# Patient Record
Sex: FEMALE | Race: WHITE | Employment: OTHER | URBAN - NONMETROPOLITAN AREA
[De-identification: names, ages, dates, MRNs, and addresses within clinical notes are randomized per-mention and may not be internally consistent; named-entity substitution may affect disease eponyms.]

---

## 2020-08-13 ENCOUNTER — APPOINTMENT (OUTPATIENT)
Dept: CT IMAGING | Age: 70
End: 2020-08-13
Payer: MEDICARE

## 2020-08-13 ENCOUNTER — HOSPITAL ENCOUNTER (EMERGENCY)
Age: 70
Discharge: HOME | End: 2020-08-13
Attending: EMERGENCY MEDICINE
Payer: MEDICARE

## 2020-08-13 VITALS
WEIGHT: 165 LBS | BODY MASS INDEX: 30.36 KG/M2 | RESPIRATION RATE: 16 BRPM | SYSTOLIC BLOOD PRESSURE: 160 MMHG | HEIGHT: 62 IN | DIASTOLIC BLOOD PRESSURE: 86 MMHG | TEMPERATURE: 98.7 F | HEART RATE: 66 BPM | OXYGEN SATURATION: 97 %

## 2020-08-13 LAB
ALBUMIN SERPL-MCNC: 4.6 G/DL (ref 3.5–5.2)
ALP BLD-CCNC: 94 U/L (ref 35–104)
ALT SERPL-CCNC: 20 U/L (ref 5–33)
ANION GAP SERPL CALCULATED.3IONS-SCNC: 12 MMOL/L (ref 7–19)
AST SERPL-CCNC: 23 U/L (ref 5–32)
BASOPHILS ABSOLUTE: 0.1 K/UL (ref 0–0.2)
BASOPHILS RELATIVE PERCENT: 0.8 % (ref 0–1)
BILIRUB SERPL-MCNC: 0.6 MG/DL (ref 0.2–1.2)
BILIRUBIN URINE: NEGATIVE
BLOOD, URINE: NEGATIVE
BUN BLDV-MCNC: 13 MG/DL (ref 8–23)
CALCIUM SERPL-MCNC: 9.7 MG/DL (ref 8.8–10.2)
CHLORIDE BLD-SCNC: 104 MMOL/L (ref 98–111)
CLARITY: CLEAR
CO2: 28 MMOL/L (ref 22–29)
COLOR: YELLOW
CREAT SERPL-MCNC: 1 MG/DL (ref 0.5–0.9)
EOSINOPHILS ABSOLUTE: 0.1 K/UL (ref 0–0.6)
EOSINOPHILS RELATIVE PERCENT: 1 % (ref 0–5)
GFR AFRICAN AMERICAN: >59
GFR NON-AFRICAN AMERICAN: 54
GLUCOSE BLD-MCNC: 113 MG/DL (ref 74–109)
GLUCOSE URINE: NEGATIVE MG/DL
HCT VFR BLD CALC: 46.4 % (ref 37–47)
HEMOGLOBIN: 15.6 G/DL (ref 12–16)
IMMATURE GRANULOCYTES #: 0.1 K/UL
KETONES, URINE: NEGATIVE MG/DL
LACTIC ACID: 1.3 MMOL/L (ref 0.5–1.9)
LEUKOCYTE ESTERASE, URINE: NEGATIVE
LIPASE: 23 U/L (ref 13–60)
LYMPHOCYTES ABSOLUTE: 2.5 K/UL (ref 1.1–4.5)
LYMPHOCYTES RELATIVE PERCENT: 26.9 % (ref 20–40)
MCH RBC QN AUTO: 30.2 PG (ref 27–31)
MCHC RBC AUTO-ENTMCNC: 33.6 G/DL (ref 33–37)
MCV RBC AUTO: 89.9 FL (ref 81–99)
MONOCYTES ABSOLUTE: 0.8 K/UL (ref 0–0.9)
MONOCYTES RELATIVE PERCENT: 8.8 % (ref 0–10)
NEUTROPHILS ABSOLUTE: 5.8 K/UL (ref 1.5–7.5)
NEUTROPHILS RELATIVE PERCENT: 62 % (ref 50–65)
NITRITE, URINE: NEGATIVE
PDW BLD-RTO: 13.1 % (ref 11.5–14.5)
PH UA: 7.5 (ref 5–8)
PLATELET # BLD: 240 K/UL (ref 130–400)
PMV BLD AUTO: 10 FL (ref 9.4–12.3)
POTASSIUM SERPL-SCNC: 3.5 MMOL/L (ref 3.5–5)
PROTEIN UA: NEGATIVE MG/DL
RBC # BLD: 5.16 M/UL (ref 4.2–5.4)
SODIUM BLD-SCNC: 144 MMOL/L (ref 136–145)
SPECIFIC GRAVITY UA: 1.01 (ref 1–1.03)
TOTAL PROTEIN: 7.8 G/DL (ref 6.6–8.7)
UROBILINOGEN, URINE: 0.2 E.U./DL
WBC # BLD: 9.3 K/UL (ref 4.8–10.8)

## 2020-08-13 PROCEDURE — 81003 URINALYSIS AUTO W/O SCOPE: CPT

## 2020-08-13 PROCEDURE — 74175 CTA ABDOMEN W/CONTRAST: CPT

## 2020-08-13 PROCEDURE — 99283 EMERGENCY DEPT VISIT LOW MDM: CPT

## 2020-08-13 PROCEDURE — 96374 THER/PROPH/DIAG INJ IV PUSH: CPT

## 2020-08-13 PROCEDURE — 96375 TX/PRO/DX INJ NEW DRUG ADDON: CPT

## 2020-08-13 PROCEDURE — 2580000003 HC RX 258: Performed by: EMERGENCY MEDICINE

## 2020-08-13 PROCEDURE — 83605 ASSAY OF LACTIC ACID: CPT

## 2020-08-13 PROCEDURE — 6360000002 HC RX W HCPCS: Performed by: EMERGENCY MEDICINE

## 2020-08-13 PROCEDURE — 83690 ASSAY OF LIPASE: CPT

## 2020-08-13 PROCEDURE — 80053 COMPREHEN METABOLIC PANEL: CPT

## 2020-08-13 PROCEDURE — 36415 COLL VENOUS BLD VENIPUNCTURE: CPT

## 2020-08-13 PROCEDURE — 85025 COMPLETE CBC W/AUTO DIFF WBC: CPT

## 2020-08-13 PROCEDURE — 6360000004 HC RX CONTRAST MEDICATION: Performed by: EMERGENCY MEDICINE

## 2020-08-13 PROCEDURE — 99284 EMERGENCY DEPT VISIT MOD MDM: CPT

## 2020-08-13 RX ORDER — MORPHINE SULFATE 4 MG/ML
4 INJECTION, SOLUTION INTRAMUSCULAR; INTRAVENOUS ONCE
Status: COMPLETED | OUTPATIENT
Start: 2020-08-13 | End: 2020-08-13

## 2020-08-13 RX ORDER — ONDANSETRON 4 MG/1
4 TABLET, ORALLY DISINTEGRATING ORAL EVERY 8 HOURS PRN
Qty: 12 TABLET | Refills: 0 | Status: SHIPPED | OUTPATIENT
Start: 2020-08-13 | End: 2020-08-17

## 2020-08-13 RX ORDER — ONDANSETRON 2 MG/ML
4 INJECTION INTRAMUSCULAR; INTRAVENOUS ONCE
Status: COMPLETED | OUTPATIENT
Start: 2020-08-13 | End: 2020-08-13

## 2020-08-13 RX ORDER — 0.9 % SODIUM CHLORIDE 0.9 %
1000 INTRAVENOUS SOLUTION INTRAVENOUS ONCE
Status: COMPLETED | OUTPATIENT
Start: 2020-08-13 | End: 2020-08-13

## 2020-08-13 RX ORDER — OXYCODONE HYDROCHLORIDE AND ACETAMINOPHEN 5; 325 MG/1; MG/1
1 TABLET ORAL EVERY 6 HOURS PRN
Qty: 12 TABLET | Refills: 0 | Status: SHIPPED | OUTPATIENT
Start: 2020-08-13 | End: 2020-08-16

## 2020-08-13 RX ADMIN — SODIUM CHLORIDE 1000 ML: 9 INJECTION, SOLUTION INTRAVENOUS at 16:50

## 2020-08-13 RX ADMIN — ONDANSETRON 4 MG: 2 INJECTION INTRAMUSCULAR; INTRAVENOUS at 16:50

## 2020-08-13 RX ADMIN — IOPAMIDOL 90 ML: 755 INJECTION, SOLUTION INTRAVENOUS at 18:18

## 2020-08-13 RX ADMIN — MORPHINE SULFATE 4 MG: 4 INJECTION, SOLUTION INTRAMUSCULAR; INTRAVENOUS at 16:50

## 2020-08-13 SDOH — HEALTH STABILITY: MENTAL HEALTH: HOW OFTEN DO YOU HAVE A DRINK CONTAINING ALCOHOL?: NEVER

## 2020-08-13 ASSESSMENT — ENCOUNTER SYMPTOMS
DIARRHEA: 1
BACK PAIN: 0
VOMITING: 0
BLOOD IN STOOL: 0
SHORTNESS OF BREATH: 0
COUGH: 0
NAUSEA: 0
RHINORRHEA: 0
ABDOMINAL PAIN: 1
SORE THROAT: 0

## 2020-08-13 ASSESSMENT — PAIN SCALES - GENERAL
PAINLEVEL_OUTOF10: 5
PAINLEVEL_OUTOF10: 7

## 2020-08-13 NOTE — ED NOTES
Patient back from CT.   Patient ambulated to bathroom for urine sample     Elham Vogel RN  08/13/20 6040

## 2020-08-13 NOTE — ED PROVIDER NOTES
[facility]  eMERGENCY dEPARTMENT eNCOUnter      Pt Name: Torres Villagran  MRN: [id number]  [place] 1950  Date of evaluation: 8/13/2020  Provider: Luis Fernando Barrera MD    [address] [place] [address]       Chief Complaint   Patient presents with    Abdominal Pain     Pt to ED with c/o abdominal pain \"for months\"          HISTORY OF PRESENT ILLNESS   (Location/Symptom, Timing/Onset,Context/Setting, Quality, Duration, Modifying Factors, Severity)  Note limiting factors. Torres Villagran is a 65 y.o. female who presents to the emergency department for abdominal pain. She points to her lower abdomen tells me \"my guts hurt\". This is been a chronic issue for actually years not months but has been worsening. She has postprandial abdominal pain after eating anything. She has had an x-ray within the past couple weeks but they are unsure if she is ever had any CT scans which seems unusual.  She has had a normal colonoscopy besides a few polyps that were removed within the past couple months by Re Johnston in [facility]. She is also had a cholecystectomy and hysterectomy. She denied having any vomiting. She has somewhat chronic yellow watery stool but not bloody. She was never a smoker. HPI    NursingNotes were reviewed. REVIEW OF SYSTEMS    (2-9 systems for level 4, 10 or more for level 5)     Review of Systems   Constitutional: Negative for chills and fever. HENT: Negative for rhinorrhea and sore throat. Respiratory: Negative for cough and shortness of breath. Cardiovascular: Negative for chest pain and leg swelling. Gastrointestinal: Positive for abdominal pain and diarrhea. Negative for blood in stool, nausea and vomiting. Genitourinary: Negative for dysuria, frequency and urgency. Musculoskeletal: Negative for back pain and neck pain. Neurological: Negative for dizziness and headaches. All other systems reviewed and are negative. PAST MEDICALHISTORY   History reviewed.  No pertinent past medical history. SURGICAL HISTORY     History reviewed. No pertinent surgical history. CURRENT MEDICATIONS     Previous Medications    No medications on file       ALLERGIES     Demerol hcl [meperidine]; Erythromycin; and Lortab [hydrocodone-acetaminophen]    FAMILY HISTORY     History reviewed. No pertinent family history.        SOCIAL HISTORY       Social History     Socioeconomic History    Marital status:      Spouse name: None    Number of children: None    Years of education: None    Highest education level: None   Occupational History    None   Social Needs    Financial resource strain: None    Food insecurity     Worry: None     Inability: None    Transportation needs     Medical: None     Non-medical: None   Tobacco Use    Smoking status: Never Smoker    Smokeless tobacco: Never Used   Substance and Sexual Activity    Alcohol use: Never     Frequency: Never    Drug use: Never    Sexual activity: None   Lifestyle    Physical activity     Days per week: None     Minutes per session: None    Stress: None   Relationships    Social connections     Talks on phone: None     Gets together: None     Attends Spiritism service: None     Active member of club or organization: None     Attends meetings of clubs or organizations: None     Relationship status: None    Intimate partner violence     Fear of current or ex partner: None     Emotionally abused: None     Physically abused: None     Forced sexual activity: None   Other Topics Concern    None   Social History Narrative    None       SCREENINGS    Mary Coma Scale  Eye Opening: Spontaneous  Best Verbal Response: Oriented  Best Motor Response: Obeys commands  Mary Coma Scale Score: 15        PHYSICAL EXAM    (up to 7 for level 4, 8 or more for level 5)     ED Triage Vitals   BP Temp Temp Source Pulse Resp SpO2 Height Weight   08/13/20 1505 08/13/20 1505 08/13/20 1633 08/13/20 1505 08/13/20 1505 08/13/20 1505 08/13/20 1505 08/13/20 1505   (!) 168/83 98.5 °F (36.9 °C) Oral 76 20 94 % 5' 2\" (1.575 m) 165 lb (74.8 kg)       Physical Exam  Vitals signs and nursing note reviewed. Constitutional:       General: She is not in acute distress. Appearance: Normal appearance. She is well-developed. She is not ill-appearing, toxic-appearing or diaphoretic. Comments: Crying anxious   HENT:      Head: Normocephalic and atraumatic. Right Ear: External ear normal.      Left Ear: External ear normal.      Mouth/Throat:      Mouth: Mucous membranes are moist.   Eyes:      Conjunctiva/sclera: Conjunctivae normal.   Neck:      Musculoskeletal: Normal range of motion. Trachea: No tracheal deviation. Cardiovascular:      Rate and Rhythm: Normal rate and regular rhythm. Heart sounds: Normal heart sounds. No murmur. Pulmonary:      Effort: No respiratory distress. Breath sounds: Normal breath sounds. No wheezing or rales. Abdominal:      Palpations: Abdomen is soft. There is no mass. Tenderness: There is abdominal tenderness. Comments: Mild lower abdominal tenderness seems to have pain out of proportion to exam though   Musculoskeletal: Normal range of motion. Skin:     General: Skin is warm and dry. Neurological:      Mental Status: She is alert and oriented to person, place, and time. GCS: GCS eye subscore is 4. GCS verbal subscore is 5. GCS motor subscore is 6. DIAGNOSTIC RESULTS           RADIOLOGY:  Non-plain film images such as CT, Ultrasound and MRI are read by the radiologist. Plain radiographic images are visualized and preliminarily interpreted bythe emergency physician with the below findings:        CTA ABDOMEN W CONTRAST   Final Result   1. 2 cm cystic lesion within the pancreas tail. Nonemergent GI   follow-up recommended. 2. Normal appearance of the abdominal aorta and visceral arterial   branches.    Signed by Dr Angela Gabriel on 8/13/2020 6:27 PM LABS:  Labs Reviewed   COMPREHENSIVE METABOLIC PANEL - Abnormal; Notable for the following components:       Result Value    Glucose 113 (*)     CREATININE 1.0 (*)     GFR Non- 54 (*)     All other components within normal limits   CBC WITH AUTO DIFFERENTIAL   LIPASE   URINE RT REFLEX TO CULTURE   LACTIC ACID, PLASMA       All other labs were within normal range or not returned as of this dictation. EMERGENCY DEPARTMENT COURSE and DIFFERENTIAL DIAGNOSIS/MDM:   Vitals:    Vitals:    08/13/20 1505 08/13/20 1633   BP: (!) 168/83 (!) 160/86   Pulse: 76 66   Resp: 20 16   Temp: 98.5 °F (36.9 °C) 98.7 °F (37.1 °C)   TempSrc:  Oral   SpO2: 94% 97%   Weight: 165 lb (74.8 kg)    Height: 5' 2\" (1.575 m)        MDM  Number of Diagnoses or Management Options     Amount and/or Complexity of Data Reviewed  Clinical lab tests: ordered and reviewed  Tests in the radiology section of CPT®: ordered and reviewed  Independent visualization of images, tracings, or specimens: yes      Patient was somewhat chronic abdominal pain mostly post prandial it seems, has had gallbladder removed, has seen GI with negative colonoscopy as well besides some polyps, due to the severity of pain and her being tearful but quite benign exam did do a CTA to evaluate for possible mesenteric ischemia and stenosis however all blood vessels appear patent and her laboratory evaluation is also very reassuring, incidental pancreatic cyst which I have informed them of. She is stable for discharge and outpatient follow-up GI for further work-up and monitoring of the pancreatic cyst, understands follow-up and return precautions      A CHRISTOPHER report was obtained. Request number [id number]. I have reviewed this report and have considered its contents in selecting medications for outpatient care of this patient's illness. I have discussed the risks of use of these medications with patient.         CONSULTS:  None    PROCEDURES:  Unless otherwise noted below, none     Procedures    FINAL IMPRESSION      1. Chronic abdominal pain    2. Pancreatic cyst          DISPOSITION/PLAN   DISPOSITION Decision To Discharge 08/13/2020 06:36:10 PM      PATIENT REFERRED TO:  Roman Shipley MD  [address]    Schedule an appointment as soon as possible for a visit in 1 week        DISCHARGE MEDICATIONS:  New Prescriptions    ONDANSETRON (ZOFRAN ODT) 4 MG DISINTEGRATING TABLET    Take 1 tablet by mouth every 8 hours as needed for Nausea or Vomiting    OXYCODONE-ACETAMINOPHEN (PERCOCET) 5-325 MG PER TABLET    Take 1 tablet by mouth every 6 hours as needed for Pain for up to 3 days. Intended supply: 3 days.  Take lowest dose possible to manage pain          (Please note that portions of this note were completed with a voice recognition program.  Efforts were made to edit thedictations but occasionally words are mis-transcribed.)    Gilda Geiger MD (electronically signed)  Attending Emergency Physician        Sarina Askew MD  08/13/20 8248

## 2020-08-17 ENCOUNTER — APPOINTMENT (OUTPATIENT)
Dept: CT IMAGING | Age: 70
End: 2020-08-17
Payer: MEDICARE

## 2020-08-17 ENCOUNTER — HOSPITAL ENCOUNTER (EMERGENCY)
Age: 70
Discharge: HOME | End: 2020-08-17
Payer: MEDICARE

## 2020-08-17 ENCOUNTER — OFFICE VISIT (OUTPATIENT)
Dept: GASTROENTEROLOGY | Age: 70
End: 2020-08-17
Payer: MEDICARE

## 2020-08-17 VITALS
BODY MASS INDEX: 29.63 KG/M2 | OXYGEN SATURATION: 97 % | RESPIRATION RATE: 14 BRPM | HEART RATE: 60 BPM | TEMPERATURE: 97 F | SYSTOLIC BLOOD PRESSURE: 125 MMHG | WEIGHT: 162 LBS | DIASTOLIC BLOOD PRESSURE: 78 MMHG

## 2020-08-17 VITALS
HEIGHT: 62 IN | BODY MASS INDEX: 29.44 KG/M2 | DIASTOLIC BLOOD PRESSURE: 80 MMHG | HEART RATE: 65 BPM | WEIGHT: 160 LBS | SYSTOLIC BLOOD PRESSURE: 130 MMHG | OXYGEN SATURATION: 98 %

## 2020-08-17 LAB
ALBUMIN SERPL-MCNC: 4 G/DL (ref 3.5–5.2)
ALP BLD-CCNC: 77 U/L (ref 35–104)
ALT SERPL-CCNC: 16 U/L (ref 5–33)
AMPHETAMINE SCREEN, URINE: NEGATIVE
ANION GAP SERPL CALCULATED.3IONS-SCNC: 11 MMOL/L (ref 7–19)
AST SERPL-CCNC: 21 U/L (ref 5–32)
BARBITURATE SCREEN URINE: NEGATIVE
BASOPHILS ABSOLUTE: 0.1 K/UL (ref 0–0.2)
BASOPHILS RELATIVE PERCENT: 0.5 % (ref 0–1)
BENZODIAZEPINE SCREEN, URINE: POSITIVE
BILIRUB SERPL-MCNC: 0.8 MG/DL (ref 0.2–1.2)
BILIRUBIN URINE: NEGATIVE
BLOOD, URINE: NEGATIVE
BUN BLDV-MCNC: 15 MG/DL (ref 8–23)
CALCIUM SERPL-MCNC: 9.6 MG/DL (ref 8.8–10.2)
CANNABINOID SCREEN URINE: NEGATIVE
CHLORIDE BLD-SCNC: 102 MMOL/L (ref 98–111)
CLARITY: ABNORMAL
CO2: 26 MMOL/L (ref 22–29)
COCAINE METABOLITE SCREEN URINE: NEGATIVE
COLOR: ABNORMAL
CREAT SERPL-MCNC: 1.1 MG/DL (ref 0.5–0.9)
EOSINOPHILS ABSOLUTE: 0.1 K/UL (ref 0–0.6)
EOSINOPHILS RELATIVE PERCENT: 0.8 % (ref 0–5)
ETHANOL: <10 MG/DL (ref 0–0.08)
GFR AFRICAN AMERICAN: >59
GFR NON-AFRICAN AMERICAN: 49
GLUCOSE BLD-MCNC: 120 MG/DL (ref 74–109)
GLUCOSE URINE: NEGATIVE MG/DL
HCT VFR BLD CALC: 45.2 % (ref 37–47)
HEMOGLOBIN: 15 G/DL (ref 12–16)
IMMATURE GRANULOCYTES #: 0 K/UL
KETONES, URINE: NEGATIVE MG/DL
LEUKOCYTE ESTERASE, URINE: NEGATIVE
LYMPHOCYTES ABSOLUTE: 2.1 K/UL (ref 1.1–4.5)
LYMPHOCYTES RELATIVE PERCENT: 19.4 % (ref 20–40)
Lab: ABNORMAL
MCH RBC QN AUTO: 30.2 PG (ref 27–31)
MCHC RBC AUTO-ENTMCNC: 33.2 G/DL (ref 33–37)
MCV RBC AUTO: 91.1 FL (ref 81–99)
MONOCYTES ABSOLUTE: 1.1 K/UL (ref 0–0.9)
MONOCYTES RELATIVE PERCENT: 10 % (ref 0–10)
NEUTROPHILS ABSOLUTE: 7.5 K/UL (ref 1.5–7.5)
NEUTROPHILS RELATIVE PERCENT: 68.9 % (ref 50–65)
NITRITE, URINE: NEGATIVE
OPIATE SCREEN URINE: NEGATIVE
PDW BLD-RTO: 13.1 % (ref 11.5–14.5)
PH UA: 6.5 (ref 5–8)
PLATELET # BLD: 218 K/UL (ref 130–400)
PMV BLD AUTO: 9.7 FL (ref 9.4–12.3)
POTASSIUM REFLEX MAGNESIUM: 3.9 MMOL/L (ref 3.5–5)
PROTEIN UA: NEGATIVE MG/DL
RBC # BLD: 4.96 M/UL (ref 4.2–5.4)
SODIUM BLD-SCNC: 139 MMOL/L (ref 136–145)
SPECIFIC GRAVITY UA: 1.01 (ref 1–1.03)
TOTAL PROTEIN: 6.8 G/DL (ref 6.6–8.7)
UROBILINOGEN, URINE: 0.2 E.U./DL
WBC # BLD: 10.9 K/UL (ref 4.8–10.8)

## 2020-08-17 PROCEDURE — 99205 OFFICE O/P NEW HI 60 MIN: CPT | Performed by: NURSE PRACTITIONER

## 2020-08-17 PROCEDURE — 3017F COLORECTAL CA SCREEN DOC REV: CPT | Performed by: NURSE PRACTITIONER

## 2020-08-17 PROCEDURE — 81003 URINALYSIS AUTO W/O SCOPE: CPT

## 2020-08-17 PROCEDURE — 1036F TOBACCO NON-USER: CPT | Performed by: NURSE PRACTITIONER

## 2020-08-17 PROCEDURE — G8427 DOCREV CUR MEDS BY ELIG CLIN: HCPCS | Performed by: NURSE PRACTITIONER

## 2020-08-17 PROCEDURE — 99285 EMERGENCY DEPT VISIT HI MDM: CPT

## 2020-08-17 PROCEDURE — 80053 COMPREHEN METABOLIC PANEL: CPT

## 2020-08-17 PROCEDURE — G0480 DRUG TEST DEF 1-7 CLASSES: HCPCS

## 2020-08-17 PROCEDURE — 36415 COLL VENOUS BLD VENIPUNCTURE: CPT

## 2020-08-17 PROCEDURE — 4040F PNEUMOC VAC/ADMIN/RCVD: CPT | Performed by: NURSE PRACTITIONER

## 2020-08-17 PROCEDURE — G8417 CALC BMI ABV UP PARAM F/U: HCPCS | Performed by: NURSE PRACTITIONER

## 2020-08-17 PROCEDURE — 1090F PRES/ABSN URINE INCON ASSESS: CPT | Performed by: NURSE PRACTITIONER

## 2020-08-17 PROCEDURE — G8400 PT W/DXA NO RESULTS DOC: HCPCS | Performed by: NURSE PRACTITIONER

## 2020-08-17 PROCEDURE — 80307 DRUG TEST PRSMV CHEM ANLYZR: CPT

## 2020-08-17 PROCEDURE — 6370000000 HC RX 637 (ALT 250 FOR IP): Performed by: NURSE PRACTITIONER

## 2020-08-17 PROCEDURE — 1123F ACP DISCUSS/DSCN MKR DOCD: CPT | Performed by: NURSE PRACTITIONER

## 2020-08-17 PROCEDURE — 85025 COMPLETE CBC W/AUTO DIFF WBC: CPT

## 2020-08-17 RX ORDER — POTASSIUM CHLORIDE 1500 MG/1
TABLET, EXTENDED RELEASE ORAL
COMMUNITY
Start: 2020-07-30

## 2020-08-17 RX ORDER — ALPRAZOLAM 1 MG/1
TABLET ORAL
COMMUNITY
Start: 2020-07-31

## 2020-08-17 RX ORDER — ATENOLOL 100 MG/1
TABLET ORAL
COMMUNITY
Start: 2020-07-19

## 2020-08-17 RX ORDER — DICYCLOMINE HYDROCHLORIDE 10 MG/1
10 CAPSULE ORAL
COMMUNITY

## 2020-08-17 RX ORDER — OLMESARTAN MEDOXOMIL 40 MG/1
TABLET ORAL
COMMUNITY
Start: 2020-07-19

## 2020-08-17 RX ORDER — AMLODIPINE BESYLATE 5 MG/1
TABLET ORAL
COMMUNITY
Start: 2020-07-27

## 2020-08-17 RX ORDER — OXYCODONE HYDROCHLORIDE AND ACETAMINOPHEN 5; 325 MG/1; MG/1
TABLET ORAL
COMMUNITY

## 2020-08-17 RX ORDER — OXYCODONE HYDROCHLORIDE AND ACETAMINOPHEN 5; 325 MG/1; MG/1
1 TABLET ORAL ONCE
Status: COMPLETED | OUTPATIENT
Start: 2020-08-17 | End: 2020-08-17

## 2020-08-17 RX ORDER — PANTOPRAZOLE SODIUM 40 MG/1
TABLET, DELAYED RELEASE ORAL
COMMUNITY
Start: 2020-05-29

## 2020-08-17 RX ORDER — SIMVASTATIN 40 MG
TABLET ORAL
COMMUNITY
Start: 2020-05-29

## 2020-08-17 RX ADMIN — OXYCODONE HYDROCHLORIDE AND ACETAMINOPHEN 1 TABLET: 5; 325 TABLET ORAL at 17:56

## 2020-08-17 ASSESSMENT — ENCOUNTER SYMPTOMS
RECTAL PAIN: 0
COUGH: 0
SHORTNESS OF BREATH: 0
ABDOMINAL PAIN: 1
BLOOD IN STOOL: 0
DIARRHEA: 1
VOMITING: 0
TROUBLE SWALLOWING: 1
ABDOMINAL DISTENTION: 0
ABDOMINAL PAIN: 1
CONSTIPATION: 1
CHOKING: 0
NAUSEA: 1
ANAL BLEEDING: 0

## 2020-08-17 ASSESSMENT — PAIN SCALES - GENERAL: PAINLEVEL_OUTOF10: 7

## 2020-08-17 NOTE — ED PROVIDER NOTES
[facility]  eMERGENCY dEPARTMENT eNCOUnter      Pt Name: Yoanna Salcedo  MRN: [id number]  [place] 1950  Date of evaluation: 8/17/2020  Provider: DELFINA Mcallister    CHIEF COMPLAINT       Chief Complaint   Patient presents with    Mental Health Problem         HISTORY OF PRESENT ILLNESS   (Location/Symptom, Timing/Onset,Context/Setting, Quality, Duration, Modifying Factors, Severity)  Note limiting factors. Yoanna Salcedo is a 65 y.o. female who presents to the emergency department with her . HAs been having chronic abd pain. SAys she can't take it anymore. perfers not be alive. No active suicidal plan. Patient was seeing GI when she expressed her state of mind and was sent over here. Her  believes she needs some help. Patient has a lot of stress in her life per her  and she manifests that with pain in her abdomen. She denies any alcohol or drugs. She denies any previous suicide attempts. She says she has not seen a psychiatrist.  She does take benzos for anxiety chronically. The history is provided by the patient and the spouse. Mental Health Problem   Presenting symptoms: suicidal thoughts    Degree of incapacity (severity): Moderate  Onset quality:  Unable to specify  Context: stressful life event    Context: not alcohol use and not drug abuse    Treatment compliance:  Untreated  Associated symptoms: abdominal pain    Associated symptoms comment:  Chronic pain  Risk factors: no hx of mental illness and no hx of suicide attempts        NursingNotes were reviewed. REVIEW OF SYSTEMS    (2-9 systems for level 4, 10 or more for level 5)     Review of Systems   Gastrointestinal: Positive for abdominal pain. Psychiatric/Behavioral: Positive for suicidal ideas. Except as noted above the remainder of the review of systems was reviewed and negative. PAST MEDICAL HISTORY   History reviewed. No pertinent past medical history.       SURGICALHISTORY Past Surgical History:   Procedure Laterality Date    COLONOSCOPY  06/2020    Dr Bernard Kind: : polyps per pt recall    UPPER GASTROINTESTINAL ENDOSCOPY  approx [id number]    Dr Blanco Brand: :\" normal per pt recall         CURRENT MEDICATIONS       Discharge Medication List as of 8/17/2020  6:30 PM      CONTINUE these medications which have NOT CHANGED    Details   atenolol (TENORMIN) 100 MG tablet TAKE 1 TABLET BY MOUTH EVERY DAYHistorical Med      olmesartan (BENICAR) 40 MG tablet TAKE 1 TABLET BY MOUTH EVERY DAYHistorical Med      amLODIPine (NORVASC) 5 MG tablet TAKE 1 TABLET BY MOUTH EVERY DAY IN THE EVENINGHistorical Med      pantoprazole (PROTONIX) 40 MG tablet TAKE 1 TABLET BY MOUTH TWICE A DAYHistorical Med      simvastatin (ZOCOR) 40 MG tablet TAKE 1 TABLET BY MOUTH EVERY DAYHistorical Med      KLOR-CON M20 20 MEQ extended release tablet TAKE 1 TABLET BY MOUTH EVERY DAY, DAWHistorical Med      ALPRAZolam (XANAX) 1 MG tablet TAKE 1 TABLET BY MOUTH THREE TIMES A DAYHistorical Med      oxyCODONE-acetaminophen (PERCOCET) 5-325 MG per tablet oxycodone-acetaminophen 5 mg-325 mg tabletHistorical Med      dicyclomine (BENTYL) 10 MG capsule Take 10 mg by mouth 3 times daily (before meals)Historical Med             ALLERGIES     Demerol hcl [meperidine];  Erythromycin; and Lortab [hydrocodone-acetaminophen]    FAMILY HISTORY       Family History   Problem Relation Age of Onset    Prostate Cancer Father     Cancer Father         spread thur out body    Colon Polyps Brother     Colon Cancer Neg Hx           SOCIAL HISTORY       Social History     Socioeconomic History    Marital status:      Spouse name: None    Number of children: None    Years of education: None    Highest education level: None   Occupational History    None   Social Needs    Financial resource strain: None    Food insecurity     Worry: None     Inability: None    Transportation needs     Medical: None     Non-medical: None   Tobacco Use  Smoking status: Never Smoker    Smokeless tobacco: Never Used   Substance and Sexual Activity    Alcohol use: Never     Frequency: Never    Drug use: Never    Sexual activity: None   Lifestyle    Physical activity     Days per week: None     Minutes per session: None    Stress: None   Relationships    Social connections     Talks on phone: None     Gets together: None     Attends Scientology service: None     Active member of club or organization: None     Attends meetings of clubs or organizations: None     Relationship status: None    Intimate partner violence     Fear of current or ex partner: None     Emotionally abused: None     Physically abused: None     Forced sexual activity: None   Other Topics Concern    None   Social History Narrative    None       SCREENINGS    [place] Coma Scale  Eye Opening: Spontaneous  Best Verbal Response: Oriented  Best Motor Response: Obeys commands  Mary Coma Scale Score: 15 @FLOW([id number])@      PHYSICAL EXAM    (up to 7 for level 4, 8 or more for level 5)     ED Triage Vitals [08/17/20 1607]   BP Temp Temp src Pulse Resp SpO2 Height Weight   125/78 97 °F (36.1 °C) -- 65 14 97 % -- 162 lb (73.5 kg)       Physical Exam  Vitals signs and nursing note reviewed. Constitutional:       Appearance: She is well-developed. HENT:      Head: Normocephalic and atraumatic. Eyes:      General: No scleral icterus. Right eye: No discharge. Left eye: No discharge. Neck:      Musculoskeletal: Normal range of motion and neck supple. Cardiovascular:      Rate and Rhythm: Normal rate. Pulmonary:      Effort: No respiratory distress. Neurological:      Mental Status: She is alert. Psychiatric:         Mood and Affect: Affect is flat. Speech: She is noncommunicative. Behavior: Behavior normal.         Thought Content: Thought content includes suicidal ideation.          Cognition and Memory: Cognition normal.         DIAGNOSTIC RESULTS EKG: All EKG's are interpreted by the Emergency Department Physician who either signs or Co-signsthis chart in the absence of a cardiologist.        RADIOLOGY:   Baljit Miller such as CT, Ultrasound and MRI are read by the radiologist. Plain radiographic images are visualized and preliminarily interpreted by the emergency physician with the below findings:      Interpretation per the Radiologist below, if available at the time of this note:    No orders to display         ED BEDSIDEULTRASOUND:   Performed by ED Physician -none    LABS:  Labs Reviewed   CBC WITH AUTO DIFFERENTIAL - Abnormal; Notable for the following components:       Result Value    WBC 10.9 (*)     Neutrophils % 68.9 (*)     Lymphocytes % 19.4 (*)     Monocytes Absolute 1.10 (*)     All other components within normal limits   COMPREHENSIVE METABOLIC PANEL W/ REFLEX TO MG FOR LOW K - Abnormal; Notable for the following components:    Glucose 120 (*)     CREATININE 1.1 (*)     GFR Non- 49 (*)     All other components within normal limits   URINE RT REFLEX TO CULTURE - Abnormal; Notable for the following components:    Clarity, UA CLOUDY (*)     All other components within normal limits   URINE DRUG SCREEN - Abnormal; Notable for the following components:    Benzodiazepine Screen, Urine Positive (*)     All other components within normal limits   ETHANOL       All other labs were within normal range or not returned as of this dictation. EMERGENCY DEPARTMENT COURSE and DIFFERENTIALDIAGNOSIS/MDM:   Vitals:    Vitals:    08/17/20 1607 08/17/20 1608   BP: 125/78    Pulse: 65 60   Resp: 14    Temp: 97 °F (36.1 °C)    SpO2: 97%    Weight: 162 lb (73.5 kg)            MDM   Pt seen by [facility] and ok'd to go home. DR Patti Iverson. Both pt and  refused a head CT. Resources to make an outpt appointment have been given to the pt and . Pt is impatient and wants to leave.    is agreeable with this plan    CONSULTS:  None    PROCEDURES:  Unless otherwise noted below, none     Procedures    FINAL IMPRESSION      1. Anxiety state    2. Depression, unspecified depression type    3.  Suicidal thoughts        DISPOSITION/PLAN   DISPOSITION        PATIENT REFERRED TO:  [address]  [phone]  Schedule an appointment as soon as possible for a visit  See contact list of psychiatrists      DISCHARGE MEDICATIONS:  Discharge Medication List as of 8/17/2020  6:30 PM             (Please note that portions of this note were completed with a voice recognitionprogram.  Efforts were made to edit the dictations but occasionally words are mis-transcribed.)    DELFINA Schultz (electronically signed)          DELFINA Schultz  08/18/20 3234

## 2020-08-17 NOTE — PROGRESS NOTES
[facility] ADULT INITIAL INTAKE ASSESSMENT     8/17/20    Jesús Cameron ,a 65 y.o. female, presents to the ED for a psychiatric assessment. ED Arrival time: [address]   ED physician: Bal Edouard [facility] AFFILIATE OF [facility] Notification time: [phone]  JAVIER Assessment start time: 1805  Psychiatrist call time: [phone] with Dr. Sherry Jung    Patient is referred by: GI physician    Reason for visit to ED - Presenting problem:     PT states reason for ED visit, \"I only said that I wanted to die because I was in so much pain, I did not mean that I would hurt myself, if I could take back what I said I would. \"    Patient seen in room lying in bed with  at bedside. Patient has been dealing with chronic abdominal pain for a couple of years and she reports \"it's just been taking a toll on me\". Patient denies current suicidal thoughts, no homicidal thoughts and no AVH. Patient is not delusional or psychotic. Patient's  comfortable with patient going home, there are no guns in the home and patient does not have any psychiatric history.      Duration of symptoms: 2 years    Current Stressors: abdominal pain    C-SSRS Completed: yes    SI:  denies   Plan: no   Past SI attempts: no   If yes, when and how many times:  Describe suicide attempts:   HI: denies  If yes describe:   Delusions: denies  If yes describe:   Hallucinations: denies   If yes describe:   Risk of Harm to self: Self injurious/self mutilation behaviorsno   If yes explain:   Was it within the past 6 months: no   Risk of Harm to others: no   If yes explain:   Was it within the past 6 months: no   Trauma History: denies  Anxiety 1-10:  10  Explain if increased:   Depression 1-10:  0  Explain if increased:   Level of function outside hospital decreased: no   If yes explain:       Psychiatric Hospitalizations: No   Where & When: n/a  Outpatient Psychiatric Treatment: denies    Family History:    Family history of mental illness: yes father had tablet, TAKE 1 TABLET BY MOUTH EVERY DAY, Disp: , Rfl:     ALPRAZolam (XANAX) 1 MG tablet, TAKE 1 TABLET BY MOUTH THREE TIMES A DAY, Disp: , Rfl:     oxyCODONE-acetaminophen (PERCOCET) 5-325 MG per tablet, oxycodone-acetaminophen 5 mg-325 mg tablet, Disp: , Rfl:     dicyclomine (BENTYL) 10 MG capsule, Take 10 mg by mouth 3 times daily (before meals), Disp: , Rfl:      Mental Status Evaluation:     Appearance:  age appropriate   Behavior:  Restless & fidgety   Speech:  normal pitch and normal volume   Mood:  anxious   Affect:  normal   Thought Process:  circumstantial   Thought Content:  Denies SI, HI, AVH, not delusional or psychotic   Sensorium:  person, place, time/date and situation   Cognition:  grossly intact   Insight:  good       Collateral Information:     Name: Gardenia Schofield  Relationship:   Phone Number: [phone]  Collateral: See above    Current living arrangement:Lives with  in [address]: Self and   Employment: Retired    Disposition:     Choose one of the options below for disposition:     1. Decision to admit to :no      Does the patient have a guardian or Medical POA: no  Has the guardian been notified or Medical POA:       2. Decision to Discharge:   Does not meet criteria for acceptance to   unit due to: denies SI, HI, AVH, not delusional or psychotic  Patient given contact information for local psychiatrist in area due to depression and anxiety related to her chronic abdominal pain. Safety plan completed with patient and patient given copy and copy sent with ER chart.     3. Transferred:       Patient was transferred due to: n/a    Other follow up information provided:      Suly Mack RN

## 2020-08-17 NOTE — ED TRIAGE NOTES
PT presents to ED c/o SI thoughts, no plan, denies hi. PT has had abd pain x several months and states her pain is making her wish she was dead.

## 2020-08-17 NOTE — PROGRESS NOTES
Subjective:     Patient ID: Juana Hernández is a 65 y.o. female  PCP: Nan Keith  Referring Provider: Valentino Ohs, MD    HPI  Patient presents to the office today with the following complaints: New Patient and Abdominal Pain    Abdominal Pain  Patient complains of abdominal pain. The pain is located in the lower abdomen. The pain is described as \"unbearable. \" Onset was years ago. This seems to be worsening. Aggravating factors include food. Alleviating factors include pain medication. Associated symptoms include nausea, diarrhea, heartburn, indigestion, night sweats, and loss of appetite. The patient denies hematochezia. She reports stools are dark in color, tarry. She reports small BM daily. She has tried and failed stool softeners, Colestid, and Bentyl. Reports she has not ate much at all since ER visit. She reports some trouble swallowing, \"I get choked\" with solids and liquids. She reports post prandial \"bile diarrhea. \"    Pt is very tearful. \"I just want to die. \"  \"When I am by myself, if I had the means I would take care of it. \"  Pt  states she cries daily and often mentions she wants to die. Pt denies any suicidal plan today. \"I just want my stomach better. \"  Tearful throughout entire visit. She was seen in ER on 8/13/2020  Narrative    CTA ABDOMEN W CONTRAST    8/13/2020 6:23 PM    History: Postprandial abdominal pain and diarrhea. Symptoms of    mesenteric ischemia. In order to have a CT radiation dose as low as reasonably achievable    Automated Exposure Control was utilized for adjustment of the mA    and/or KV according to patient size. DLP in mGycm= 1167. Abdomen pelvis CT with IV contrast injection. No comparison    Cardiomegaly. Chronic change at the lung bases. Cholecystectomy clips. Normal liver and spleen. Lobular 22 x 20 mm cystic lesion within the tail of the pancreas.     Nonemergent GI follow-up can be obtained to determine whether CT follow-up or endoscopic ultrasound evaluation is most appropriate. Normal abdominal aorta and iliac arteries. Normally patent celiac artery, SMA, and SAMMY. No bowel dilation. No mesenteric edema. Scattered sigmoid diverticula with no diverticulitis. No colitis. No appendicitis. No pelvic mass or fluid.         Impression    1. 2 cm cystic lesion within the pancreas tail. Nonemergent GI    follow-up recommended. 2. Normal appearance of the abdominal aorta and visceral arterial    branches. Signed by Dr Libra Servin on 8/13/2020 6:27 PM      Pt states cyst on pancreas is known to patient. Last colonoscopy 6/2020 - polyps per patient     This was my first time assessing Ms. Carranza    Assessment:     1. Depression with suicidal ideation    2. Chronic abdominal pain    3. Tearfulness         Plan:   - I am very concerned with patient's mental health today as she is expressing suicidal thoughts. Pt reports several stressors in her life currently and family history of suicide. Pt agrees to be evaluated at [facility]) ER for suicidal ideations and severe depression. Pt and  agree to head to ER.    - Plan to schedule EGD for evaluation of abdominal pain  - Follow up after EGD   - Continue Protonix    Due to patient's mental state, there is a direct threat to her life today that requires immediate evaluation. Orders  No orders of the defined types were placed in this encounter. Medications  No orders of the defined types were placed in this encounter. Patient History:     History reviewed. No pertinent past medical history.     Past Surgical History:   Procedure Laterality Date    COLONOSCOPY  06/2020    Dr Rigo Milligan: : polyps per pt recall    UPPER GASTROINTESTINAL ENDOSCOPY  approx [id number]    Dr Indira Le: :\" normal per pt recall       Family History   Problem Relation Age of Onset    Prostate Cancer Father     Cancer Father         spread thur out body    Colon Polyps Brother     Colon Cancer Neg Hx        Social History     Socioeconomic History    Marital status:      Spouse name: None    Number of children: None    Years of education: None    Highest education level: None   Occupational History    None   Social Needs    Financial resource strain: None    Food insecurity     Worry: None     Inability: None    Transportation needs     Medical: None     Non-medical: None   Tobacco Use    Smoking status: Never Smoker    Smokeless tobacco: Never Used   Substance and Sexual Activity    Alcohol use: Never     Frequency: Never    Drug use: Never    Sexual activity: None   Lifestyle    Physical activity     Days per week: None     Minutes per session: None    Stress: None   Relationships    Social connections     Talks on phone: None     Gets together: None     Attends Yarsani service: None     Active member of club or organization: None     Attends meetings of clubs or organizations: None     Relationship status: None    Intimate partner violence     Fear of current or ex partner: None     Emotionally abused: None     Physically abused: None     Forced sexual activity: None   Other Topics Concern    None   Social History Narrative    None       Current Outpatient Medications   Medication Sig Dispense Refill    atenolol (TENORMIN) 100 MG tablet TAKE 1 TABLET BY MOUTH EVERY DAY      olmesartan (BENICAR) 40 MG tablet TAKE 1 TABLET BY MOUTH EVERY DAY      amLODIPine (NORVASC) 5 MG tablet TAKE 1 TABLET BY MOUTH EVERY DAY IN THE EVENING      pantoprazole (PROTONIX) 40 MG tablet TAKE 1 TABLET BY MOUTH TWICE A DAY      simvastatin (ZOCOR) 40 MG tablet TAKE 1 TABLET BY MOUTH EVERY DAY      KLOR-CON M20 20 MEQ extended release tablet TAKE 1 TABLET BY MOUTH EVERY DAY      ALPRAZolam (XANAX) 1 MG tablet TAKE 1 TABLET BY MOUTH THREE TIMES A DAY      oxyCODONE-acetaminophen (PERCOCET) 5-325 MG per tablet oxycodone-acetaminophen 5 mg-325 mg tablet      dicyclomine (BENTYL) 10 MG capsule Take 10 mg by mouth 3 times daily (before meals)       No current facility-administered medications for this visit. Allergies   Allergen Reactions    Demerol Hcl [Meperidine]     Erythromycin     Lortab [Hydrocodone-Acetaminophen]        Review of Systems   Constitutional: Positive for activity change, appetite change and fatigue. Negative for fever and unexpected weight change. HENT: Positive for trouble swallowing. Respiratory: Negative for cough, choking and shortness of breath. Cardiovascular: Negative for chest pain. Gastrointestinal: Positive for abdominal pain, constipation, diarrhea and nausea. Negative for abdominal distention, anal bleeding, blood in stool, rectal pain and vomiting. Reflux     Allergic/Immunologic: Negative for food allergies. Psychiatric/Behavioral: Positive for self-injury and suicidal ideas. The patient is nervous/anxious. All other systems reviewed and are negative. Objective:     /80   Pulse 65   Ht 5' 2\" (1.575 m)   Wt 160 lb (72.6 kg)   SpO2 98%   BMI 29.26 kg/m²     Physical Exam  Vitals signs reviewed. Constitutional:       General: She is not in acute distress. Appearance: She is well-developed. HENT:      Head: Normocephalic and atraumatic. Right Ear: External ear normal.      Left Ear: External ear normal.      Nose: Nose normal.      Comments: Mask on     Mouth/Throat:      Comments: Mask on  Eyes:      Conjunctiva/sclera: Conjunctivae normal.      Pupils: Pupils are equal, round, and reactive to light. Neck:      Musculoskeletal: Normal range of motion and neck supple. Cardiovascular:      Rate and Rhythm: Normal rate and regular rhythm. Heart sounds: Normal heart sounds. No murmur. No friction rub. No gallop. Pulmonary:      Effort: Pulmonary effort is normal. No respiratory distress. Breath sounds: Normal breath sounds.    Abdominal:      General: Bowel sounds are normal. There is no distension. Palpations: Abdomen is soft. There is no mass. Tenderness: There is no abdominal tenderness. There is no guarding or rebound. Musculoskeletal: Normal range of motion. Skin:     General: Skin is warm and dry. Findings: No rash. Nails: There is no clubbing. Neurological:      Mental Status: She is alert and oriented to person, place, and time. Gait: Gait normal.   Psychiatric:         Mood and Affect: Mood is depressed. Affect is tearful. Behavior: Behavior normal.         Thought Content: Thought content includes suicidal ideation.

## 2020-08-18 ENCOUNTER — TELEPHONE (OUTPATIENT)
Dept: GASTROENTEROLOGY | Age: 70
End: 2020-08-18

## 2020-08-18 RX ORDER — SUCRALFATE ORAL 1 G/10ML
1 SUSPENSION ORAL 4 TIMES DAILY
Qty: 1200 ML | Refills: 3 | Status: SHIPPED | OUTPATIENT
Start: 2020-08-18 | End: 2020-11-17

## 2020-08-18 NOTE — TELEPHONE ENCOUNTER
Assessment:      1. Depression with suicidal ideation    2. Chronic abdominal pain    3. Tearfulness                    Plan:   - I am very concerned with patient's mental health today as she is expressing suicidal thoughts. Pt reports several stressors in her life currently and family history of suicide. Pt agrees to be evaluated at [facility]) ER for suicidal ideations and severe depression. Pt and  agree to head to ER.    - Plan to schedule EGD for evaluation of abdominal pain  - Follow up after EGD   - Continue Protonix     Due to patient's mental state, there is a direct threat to her life today that requires immediate evaluation.          Patient was seen yesterday as NP, the  called from [phone] said he really needs to speak to Lita May regarding his wife and their ED visit yesterday that did not go well at all. The  said Lita May was so kind to them and this is what they need right now and the  said the personal information he really wants to discuss with [facility] only if she would be so kind to call him back.   martha

## 2020-11-11 ENCOUNTER — TELEPHONE (OUTPATIENT)
Dept: GASTROENTEROLOGY | Age: 70
End: 2020-11-11

## 2020-11-11 NOTE — TELEPHONE ENCOUNTER
11/11/20 SENT NO REACH LETTER  9/28/20 - LM   8/21/20 - LM  8/19/20 - lm asking patient to call back to sched EGD - trouble swallowing, abd pain, black stools - [facility] - ANY LOCATION

## 2020-11-17 RX ORDER — SUCRALFATE 1 G/1
TABLET ORAL
Qty: 360 TABLET | Refills: 1 | Status: SHIPPED | OUTPATIENT
Start: 2020-11-17

## 2024-12-30 ENCOUNTER — OUTSIDE FACILITY SERVICE (OUTPATIENT)
Dept: CARDIOLOGY | Facility: CLINIC | Age: 74
End: 2024-12-30
Payer: MEDICARE